# Patient Record
(demographics unavailable — no encounter records)

---

## 2024-10-30 NOTE — HISTORY OF PRESENT ILLNESS
[FreeTextEntry1] : 75-year-old female with hx of HLD  Patient seen by PCP for hot flashes/sweating on and off for 20 years but worsened in 8/2024. Patient had blood work done at that visit showing IgM kappa band, she saw hematology who did not feel any underlying hematological issue was present. She elevated CRP as well. CT of the chest/abdomen/pelvis was done by his PCP  with no suspicious findings.  Patient reports stiffness in neck going down to both shoulder ( R>L) She reports she also have pain in both quads and hamstrings and b/l knee Also left side hip Getting up from seated position hurts, bending down causes pain in her knees. Chronic back in her low back (L5 disc herniation) Morning stiffness until she takes NSAIDs and tylenol.  Reports low grade fever with night sweat - twice during this time. Occasional shortness of breath Chronic diarrhea since C diff 2017    Patient denies  joint swelling, joint erythema/warmth, morning stiffness, fatigue, chills, weight loss, chest pain, abdominal pain, , constipation, blood in stool, dysuria, hematuria, rash, Raynaud's, severe dry eyes, severe dry mouth, , eye pain/redness, vision changes,  jaw clauddication, dysphagia, numbness/tingling Hx of DVT/PEs.   Labs  9/13/2024   Negative COMPA, RF  ESR 25 normal for age  CRP 45     1/26/23  Negative COMPA, SSA, SSB, RF     PMHx: As above PSHx: right hip replacement 2/2024, ankle surgery b//l 2006 Family Hx: Denies family history of rheumatologic conditions including RA, SLE, Sjogren's, Myositis, scleroderma, or vasculitis Social Hx:  Smoking Hx: former, until 1979 EtOH Hx: social  Drug use: denies  Occupation: retired

## 2024-10-30 NOTE — PHYSICAL EXAM
[TextEntry] :   GENERAL: Appears in no acute distress HEENT: EOMI. No conjunctival erythema. Moist mucous membranes. No nasopharyngeal ulcers NECK: Supple, no cervical lymphadenopathy CARDIOVASCULAR: RRR. S1, S2 auscultated.  PULMONARY: Clear to auscultation b/l,  MSK: No active synovitis, swelling, erythema, or warmth. No joint tenderness to palpation. No Bouchards or Heberdens nodes No deformities. Normal ROM of b/l upper and lower extremities. SKIN: No lesions or rashes NEURO: No focal deficits. Motor strength 5/5 in major muscle groups of b/l UE and LE. Sensation to soft touch intact in major dermatomes of b/l UE and LE. PSYCH: Normal affect and thought process.

## 2024-10-30 NOTE — ASSESSMENT
[FreeTextEntry1] : 75-year-old female with hx of HLD  Patient seen by PCP for hot flashes/sweating on and off for 20 years but worsened in 8/2024. Patient had blood work done at that visit showing IgM kappa band, she saw hematology who did not feel any underlying hematological issue was present. She had elevated CRP as well. CT of the chest/abdomen/pelvis was done by his PCP  with no suspicious findings.  Patient reports stiffness in neck going down to both shoulder ( R>L) She reports she also have pain in both quads and hamstrings and b/l knee Also left side hip Getting up from seated position hurts, bending down causes pain in her knees. Chronic back in her low back (L5 disc herniation) Morning stiffness until she takes NSAIDs and tylenol.  Reports low grade fever with night sweat - twice during this time. Occasional shortness of breath Chronic diarrhea since C diff 2017  Had neg COMPA, RF CRP elevated, ESR normal for age  - Causes of high ESR/CRP include systemic inflammatory disease, infection, malignancy. ESR is also higher with increasing age, female sex, anemia, renal disease, and obesity. - Given joint pain, will complete work up with labs as below. Suspicion for possible PMR  (due to shoulder and hip stiffness) however will first complete work up. - Also obtain imaging of neck, shoulders, and knees - Continues NSAIDs/tylenol as needed for now   Follow up 1 month to review above Total time spent in review of patient history, clinical exam, management, counseling, and plan of care:  60min

## 2024-11-26 NOTE — ASSESSMENT
[FreeTextEntry1] : 75-year-old female with hx of HLD ,cervical and lumbar spine degenerative disease   Patient seen by PCP for hot flashes/sweating on and off for 20 years but worsened in 8/2024. Patient had blood work done at that visit showing IgM kappa band, she saw hematology who did not feel any underlying hematological issue was present. She had elevated CRP as well.  CT of the chest/abdomen/pelvis was done by his PCP with no suspicious findings.  Patient symptoms are stiffness in neck going down to both shoulder ( R>L) ; patient is not sure when the shoulder/neck symptoms started She reports she also have pain in both quads and hamstrings and b/l knee  Also left side hip .  The lower extremity symptoms started in June after she was playing pickle ball. Getting up from seated position hurts, bending down causes pain in her knees.  Chronic back in her low back (L5 disc herniation) .  Also reports cervical spine degenerative disease Reports low grade fever with night sweat - twice during this time.  Occasional shortness of breath  Chronic diarrhea since C diff 2017      Had neg COMPA, RF, CCP, 14. 33 eta protein  CRP elevated, ESR elevated     - Suspicion for possible PMR (due to shoulder and hip stiffness) and elevated ESR/CRP .  Denies GCA symptoms - Patient being treated with prednisone for possible PMR.  However, she did not have significant relief with prednisone 15mg which is unusual in PMR. She was given prednisone 20mg for 2 weeks which helped more.She is now down to prednisone 15mg since 11/20 ...complete 2 weeks of 15mg prednisone, then 12.5mg for 2 weeks, then 10mg for 2 weeks, then 7.5mg for 2 weeks, then will taper to  5mg prednisone for 2 weeks, 4mg for 2 weeks, 3mg for 2 weeks, 1mg for 2 weeks then stop. Will see patient in 6 weeks (when she is around 7.5mg)  .  Reviewed side effects of steroid therapy in detail with the patient - Neck symptoms due to deg disease -she reports she has a chronic history of - Shoulder imaging with advanced OA which is likely contributing to her symptoms .  Consult given for orthopedics - Knee mild early OA  -Overall there is component of osteoarthritis which might be contributing to her joint pain.  However we will continue to treat as PMR for now given symptoms.   Follow-up in around 6 weeks   Total time spent in review of patient history, clinical exam, management, counseling, and plan of care:  30min

## 2024-11-26 NOTE — HISTORY OF PRESENT ILLNESS
[FreeTextEntry1] : 75-year-old female with hx of HLD , cervical and lumbar spine degenerative disease  Seen initially in 10/2024   As per initial note:   Patient seen by PCP for hot flashes/sweating on and off for 20 years but worsened in 8/2024. Patient had blood work done at that visit showing IgM kappa band, she saw hematology who did not feel any underlying hematological issue was present. She was noted to have elevated CRP as well.  CT of the chest/abdomen/pelvis was done by his PCP with no suspicious findings.  Patient reports stiffness in neck going down to both shoulder ( R>L)  She reports she also have pain in both quads and hamstrings and b/l knee  Also left side hip  Getting up from seated position hurts, bending down causes pain in her knees.  Chronic back in her low back (L5 disc herniation)  Morning stiffness until she takes NSAIDs and tylenol.  Reports low grade fever with night sweat - twice during this time.  Occasional shortness of breath  Chronic diarrhea since C diff 2017  Patient denies joint swelling, joint erythema/warmth, morning stiffness, fatigue, chills, weight loss, chest pain, abdominal pain, , constipation, blood in stool, dysuria, hematuria, rash, Raynaud's, severe dry eyes, severe dry mouth, , eye pain/redness, vision changes, jaw clauddication, dysphagia, numbness/tingling Hx of DVT/PEs.      Since last visit, given elevated ESR/CRP and symptoms possibly due to PMR, we did trial of  low dose prednisone.   She did not have improvement with prednisone 15mg. Patient dd have improvement in symptoms on prednisone 20mg. Prednisone 20mg from 11/5/24 to 11/19/24 and then taper to prednisone 15mg. Currently on prednisone 15mg since 11/20/24  Since being on prednisone, most symptoms are improved a lot wakes up in AM with shoulder, knees, quads, groin area stiffness that resolves  Noticed some palpitations, has joint close support group for PMR and she was recommended to split the dose of the prednisone to see if there is improvement in the palpitations I discussed results of the imaging with her which shows significant OA of the shoulders as well as cervical spine.      Labs   10/29/24:  ESR  44 CRP 37  Neg CCP, 14.33 eta protein  9/13/2024  Negative COMPA, RF ESR 25 normal for age  CRP 45      1/26/23   Negative COMPA, SSA, SSB, RF      Imaging:   10/30/2024   Xrays   Cervical spine  Straightening of the cervical lordosis may represent muscle spasm. Moderate to severe multilevel spondylosis of the cervical spine   Shoulder  Impression: Mild osteoarthritis of the bilateral shoulders.   Knee:  Impression: Bilateral osteopenia. Bilateral patellar spurring.      ULTRASOUND 11/2024  Shoulder:    IMPRESSION: 1. No active synovitis or tenosynovitis of bilateral shoulders. 2. At least moderate bilateral glenohumeral osteoarthritis with right larger than left effusions partially decompressing into the proximal long head biceps tendon sheaths. 3. Advanced bilateral acromioclavicular arthrosis. 4. Right worse than left rotator cuff and proximal long head biceps tendinosis without significant tear.   PMHx: As above  PSHx: right hip replacement 2/2024, ankle surgery b//l 2006  Family Hx: Denies family history of rheumatologic conditions including RA, SLE, Sjogren's, Myositis, scleroderma, or vasculitis  Social Hx:  Smoking Hx: former, until 1979  EtOH Hx: social  Drug use: denies  Occupation: retired

## 2025-01-02 NOTE — HISTORY OF PRESENT ILLNESS
[FreeTextEntry8] : Patient presents to discuss her rheumatologic issues.  Patient is currently following with rheumatology, diagnosed with PMR and currently on prednisone.  Prednisone has been gradually decreased but she became a little bit more symptomatic as the dose has decreased and rheumatology recommended DMARD but patient is concerned about side effects.  Patient would like my opinion on this matter  Also, patient with adrenal adenoma, did endocrinology E consult and recommendation is to do blood work but they want her off prednisone for 1 month prior to doing labs therefore that is on hold currently

## 2025-01-02 NOTE — ASSESSMENT
[FreeTextEntry1] : Answered questions to the best of my ability but would recommend another rheumatology evaluation to see if they disagree or agree with current rheumatologist recommendation, referral given.  Patient will return to the office as scheduled for regular follow-up  Attending MD available by phone if needed

## 2025-01-13 NOTE — ASSESSMENT
[FreeTextEntry1] : 75-year-old female with hx of HLD ,cervical and lumbar spine degenerative disease, right hip replacement 2/2024   Patient seen by PCP for hot flashes/sweating on and off for 20 years but worsened in 8/2024. Patient had blood work done at that visit showing IgM kappa band, she saw hematology who did not feel any underlying hematological issue was present. She had elevated CRP as well. CT of the chest/abdomen/pelvis was done by his PCP with no suspicious findings. Patient symptoms are stiffness in neck going down to both shoulder ( R>L) ; patient is not sure when the shoulder/neck symptoms started She reports she also have pain in both quads and hamstrings and b/l knee Also left side hip. The lower extremity symptoms started in June after she was playing pickle ball. Getting up from seated position hurts, bending down causes pain in her knees. Chronic back in her low back (L5 disc herniation). Also reports cervical spine degenerative disease Reports low grade fever with night sweat - twice during this time. Occasional shortness of breath Chronic diarrhea since C diff 2017    Had neg COMPA, RF, CCP, 14. 33 eta protein CRP elevated, ESR elevated Work up with increased inflammation markers however no inflammation noted on exam or imaging She does have knee OA, cervical spine OA, and shoulder OA as well . Has had right hip replacement    - Suspicion for possible PMR (due to shoulder and hip stiffness) and elevated ESR/CRP. Denies GCA symptoms - Patient being treated with prednisone for possible PMR. However, she did not have significant relief with prednisone 15mg which is unusual in PMR. She was given prednisone 20mg for 2 weeks which helped more Prednisone 20mg from 11/5/24 to 11/19/24 and then taper to prednisone 15mg.She then did  15mg prednisone for 2 weeks, then 12.5mg for 2 weeks, then 10mg for 2 weeks, then 7.5mg for 2 weeks,  However, her symptoms came back when she went down to 7.5mg (on it for a few days) , then since 1/9/25 she is back on 10mg daily On the 10mg prednisone, she has some stiffness in shoulders in the AM till around 12/1pm.She also reports knee pain.  -chronic neck symptoms due to deg disease  - Shoulder imaging with advanced OA which is likely contributing to her symptoms. Consult given for orthopedics last visit but she has not seen them - Knee mild early OA - offered PT today, deferring -Overall there is component of osteoarthritis which might be contributing to her joint pain. However we will continue to treat as PMR for now given symptoms. Patient not interested in staring DMARD therapy at this time. I discussed Leflunomide and Methotrexate as options.  - Current plan:  Finish 10mg prednisone until end of this week, then taper to : Prednisone 9mg for 2 weeks, then 8mg for 2 weeks, then 7mg for 2 weeks, continue decreasing by 1mg every 2 weeks and follow up while on 5mg or sooner if worsening symptoms -  Pt is on long-term steroid therapy and was advised on the risk of long-term steroids including but not limited to osteonecrosis, peptic ulcers/erosive gastritis, elevated blood pressure, elevated blood sugar, weight gain, osteoporosis, cushingoid features, cataracts, glaucoma, , infections. Pt was advised to monitor blood sugar and blood pressure routinely. She is monitoring bone density with PCP who ordered repeat imaging for her  Follow up 2 months  Total time spent in review of patient history, clinical exam, management, counseling, and plan of care:  40min

## 2025-01-13 NOTE — PHYSICAL EXAM
[TextEntry] : GENERAL: Appears in no acute distress HEENT: EOMI. No conjunctival erythema. Moist mucous membranes. NECK: Supple, no cervical lymphadenopathy CARDIOVASCULAR: RRR. S1, S2 auscultated. PULMONARY: Clear to auscultation b/l, MSK: No active synovitis, swelling, erythema, or warmth. No joint tenderness to palpation. No Bouchards or Heberdens nodes No deformities. Normal ROM of b/l upper and lower extremities. SKIN: No lesions or rashes NEURO: No focal deficits. Motor strength 5/5 in major muscle groups of b/l UE and LE. Sensation to soft touch intact in major dermatomes of b/l UE and LE. PSYCH: Normal affect and thought process.

## 2025-01-13 NOTE — HISTORY OF PRESENT ILLNESS
[FreeTextEntry1] : 75-year-old female with hx of HLD , cervical and lumbar spine degenerative disease, right hip replacement 2/2024  Seen initially in 10/2024  As per initial note:  Patient seen by PCP for hot flashes/sweating on and off for 20 years but worsened in 8/2024. Patient had blood work done at that visit showing IgM kappa band, she saw hematology who did not feel any underlying hematological issue was present. She was noted to have elevated CRP as well. CT of the chest/abdomen/pelvis was done by his PCP with no suspicious findings. Patient reports stiffness in neck going down to both shoulder ( R>L) She reports she also have pain in both quads and hamstrings and b/l knee Also left side hip Getting up from seated position hurts, bending down causes pain in her knees. Chronic back in her low back (L5 disc herniation) Morning stiffness until she takes NSAIDs and tylenol. Reports low grade fever with night sweat - twice during this time. Occasional shortness of breath Chronic diarrhea since C diff 2017 Patient denies joint swelling, joint erythema/warmth, morning stiffness, fatigue, chills, weight loss, chest pain, abdominal pain, , constipation, blood in stool, dysuria, hematuria, rash, Raynaud's, severe dry eyes, severe dry mouth, , eye pain/redness, vision changes, jaw clauddication, dysphagia, numbness/tingling Hx of DVT/PEs.    Since initial visit  on 10/29/24, given elevated ESR/CRP and symptoms possibly due to PMR, we did trial of low dose prednisone. She did not have improvement with prednisone 15mg. Patient did have improvement in symptoms on prednisone 20mg. Prednisone 20mg from 11/5/24 to 11/19/24 and then taper to prednisone 15mg.She then did  15mg prednisone for 2 weeks, then 12.5mg for 2 weeks, then 10mg for 2 weeks, then 7.5mg for 2 weeks,  However, her symptoms came back when she went down to 7.5mg so after around 4 days of this she went back to 10mg (as of 1/9/25 she is back on 10mg daily) On the 10mg prednisone, she has some stiffness in shoulders in the AM till around 12/1pm. She also reports knee pain She is not having significant hip stiffness  She also in a support group for PMR Discussed DMARD therapy however patient  wants to continue prednisone taper for now  Labs  11.26/24  ESR 26 CRP13 10/29/24: ESR 44 CRP 37 Neg CCP, 14.33 eta protein  9/13/2024 Negative COMPA, RF ESR 25 normal for age CRP 45  1/26/23 Negative COMPA, SSA, SSB, RF    Imaging:  10/30/2024  Xrays  Cervical spine Straightening of the cervical lordosis may represent muscle spasm. Moderate to severe multilevel spondylosis of the cervical spine  Shoulder Impression: Mild osteoarthritis of the bilateral shoulders.  Knee: Impression: Bilateral osteopenia. Bilateral patellar spurring.    ULTRASOUND 11/2024 Shoulder:   IMPRESSION: 1. No active synovitis or tenosynovitis of bilateral shoulders. 2. At least moderate bilateral glenohumeral osteoarthritis with right larger than left effusions partially decompressing into the proximal long head biceps tendon sheaths. 3. Advanced bilateral acromioclavicular arthrosis. 4. Right worse than left rotator cuff and proximal long head biceps tendinosis without significant tear.  PMHx: As above PSHx: right hip replacement 2/2024, ankle surgery b//l 2006 Family Hx: Denies family history of rheumatologic conditions including RA, SLE, Sjogren's, Myositis, scleroderma, or vasculitis Social Hx: Smoking Hx: former, until 1979 EtOH Hx: social Drug use: denies Occupation: retired

## 2025-01-30 NOTE — DATA REVIEWED
[FreeTextEntry1] : Blood work done prior to visit: CBC, UA= WNL CMP= WNL Hemoglobin A1c higher at 6.4 LDL decreased to 79 Vitamin D normal  Stress test December 2023 equivocal with cardiac CTA with nonobstructive disease Echo October 2023 = normal LVEF with trace MR carotid duplex October 2021 with right plaque without stenosiS

## 2025-01-30 NOTE — HISTORY OF PRESENT ILLNESS
[FreeTextEntry1] : 75-year-old female presents for her annual wellness visit   Medical history includes hyperlipidemia on pravastatin 40 mg daily and prediabetes.  2024 she had musculoskeletal issues evaluated by rheumatology and diagnosed with polymyalgia rheumatica initially treated with a low-dose of prednisone without benefit then on a higher dose of prednisone with improvement but still some symptoms.  She also has osteoarthritis. Also bone density January 2025 showed spine with osteopenia in hip with osteoporosis therefore told to discuss with rheumatology.  Recent increase in palpitations therefore saw cardiology 2 days ago and has a monitor on.  No chest pain or shortness of breath. She had a cardiac workup December 2020 with negative stress test and echocardiogram normal and only showing trace mitral regurgitation. Carotid duplex October 2021 showing plaque without stenosis on the right. She again had cardiac evaluation with echocardiogram October 2023 which was normal and stress test December 2023 which was equivocal therefore had a cardiac CTA with nonobstructive disease.   Otherwise feeling well. she exercises fairly regularly and follows a fairly good diet.  September, 2017 she needed dental work done and was put on clindamycin with resultant C. difficile colitis. She saw gastroenterology who treated her with p.o. vancomycin with resolution of her symptoms. The patient feels since the episode with C. difficile that her bowels are not the same as she has seen gastroenterology with diagnosis of IBS with diarrhea. Recommendation was to take probiotic daily, which she is doing. There is some improvement, but still not totally normal.  She also had an issue with her lower back and saw orthopedic with MRI August 2019 showing lumbar degenerative disc disease with radicular symptoms.   Also, history of an enchondroma of the right second toe with MRI 2019 showing it to be stable.  Imaging of the abdomen and pelvis showed an adrenal nodule for which the patient is going to see endocrinology.  She had issues with her left parotid gland and had to have a procedure May 2022 sialodoplasty with stenting with benefit.  She now feels she is having an issue on the right side and plans to follow-up with ENT.  She is status post right total hip replacement February 2024 with good benefit.  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 and is  with one daughter

## 2025-01-30 NOTE — ASSESSMENT
[FreeTextEntry1] : 75-year-old female with good general health with recent medical issue being PMR with patient following with rheumatology on prednisone.  Patient with hyperlipidemia with improved LDL to 79 Cardiac workup showing nonobstructive disease with no concern. Hemoglobin A1c in prediabetic range now worsened to 6.4 likely secondary to prednisone for PMR.  Diet and exercise and will be monitored.  Status post issue with left parotid gland status post sialodoplasy May 2022 with success. Now issues with right parotid therefore to follow-up with ENT.  Patient is status post C. difficile colitis September 2017 without residual abnormalities.Post C. difficile change in bowels with symptoms compatible with IBS with diarrhea. Recommend continuing probiotic and add fiber daily.  Adrenal nodule on the left seen on imaging with patient to see endocrinology  Status post right total hip replacement February 2024  Encourage patient improve diet and exercise to improve cholesterol and prediabetes  Influenza and COVID vaccine already received Prevnar 13 received December 2016 Shingrix received x 2 in 2019/2020 Tdap December 2016  Colonoscopy April 2022 with follow-up in 7 years Mammography December 2024 Bone density January 2025 with spine osteopenia and hip osteoporosis therefore discussed with rheumatology GYN yearly  Followup in 6 months. .

## 2025-01-30 NOTE — PHYSICAL EXAM
[FreeTextEntry1] : via GYN [Over the Past 2 Weeks, Have You Felt Down, Depressed, or Hopeless?] : 1.) Over the past 2 weeks, have you felt down, depressed, or hopeless? No [Over the Past 2 Weeks, Have You Felt Little Interest or Pleasure Doing Things?] : 2.) Over the past 2 weeks, have you felt little interest or pleasure doing things? No

## 2025-01-30 NOTE — HEALTH RISK ASSESSMENT
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

## 2025-02-27 NOTE — DISCUSSION/SUMMARY
[de-identified] :  SINDY AHUJA is a 75 year old female who presents with bilateral knee mild medial compartment arthritis.  Nonoperative treatment options for knee arthritis were discussed including anti-inflammatories, physical therapy, intraarticular cortisone injections, and hyaluronic acid gel injections.  A course of Mobic was recommended. The patient was given a prescription for the Mobic with directions. She was instructed to stop the medicine and call the office if there are any adverse reaction to the medicine.  A prescription for physical therapy was provided.  The patient declined injections today.  She will follow-up for injections in the future if her pain progresses.

## 2025-02-27 NOTE — PHYSICAL EXAM
[de-identified] :  The patient appears well nourished and in no apparent distress. The patient is alert and oriented to person, place, and time. Affect and mood appear normal. The head is normocephalic and atraumatic. The eyes reveal normal sclera and extra ocular muscles are intact. The tongue is midline with no apparent lesions. Skin shows normal turgor with no evidence of eczema or psoriasis. No respiratory distress noted. Sensation grossly intact. [de-identified] :  Exam of the bilateral knee shows 0 to 120 degrees of flexion measured with a goniometer. There is pain with flexion. 5/5 motor strength bilaterally distally. Sensation intact distally [de-identified] : Outside X-ray: 4 views of the left knee demonstrate mild medial compartment arthritis Outside X-ray: 4 views of the right knee demonstrate mild medial compartment arthritis

## 2025-02-27 NOTE — HISTORY OF PRESENT ILLNESS
[de-identified] : Ms. SINDY AHUJA is a 75 year old female presenting for evaluation of bilateral knee pain.  She was recently diagnosed with polymyalgia rheumatica and has had multiple joint pains.  She is now on prednisone.  She is reporting intermittent pain in both knees particularly posteriorly and deep flexion.  The pain is worse in the mornings and improves in the afternoon and at night she is relatively pain-free.

## 2025-03-20 NOTE — PHYSICAL EXAM
[TextEntry] :     GENERAL: Appears in no acute distress HEENT: EOMI. No conjunctival erythema. Moist mucous membranes. NECK: Supple, no cervical lymphadenopathy CARDIOVASCULAR: RRR. S1, S2 auscultated. PULMONARY: Clear to auscultation b/l, MSK: No active synovitis, swelling, erythema, or warmth. No joint tenderness to palpation. No Bouchards or Heberdens nodes No deformities. Normal ROM of b/l upper and lower extremities. SKIN: No lesions or rashes NEURO: No focal deficits. Motor strength 5/5 in major muscle groups of b/l UE and LE. PSYCH: Normal affect and thought process.

## 2025-03-20 NOTE — HISTORY OF PRESENT ILLNESS
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 Patient had blood work done at that visit showing IgM kappa band, she saw hematology who did not feel any underlying hematological issue was present. She was noted to have elevated CRP as well.  CT of the chest/abdomen/pelvis was done by his PCP with no suspicious findings.  Patient reports stiffness in neck going down to both shoulder ( R>L)  She reports she also have pain in both quads and hamstrings and b/l knee  Also left side hip  Getting up from seated position hurts, bending down causes pain in her knees.  Chronic back in her low back (L5 disc herniation)  Morning stiffness until she takes NSAIDs and tylenol.  Reports low grade fever with night sweat - twice during this time.  Occasional shortness of breath  Chronic diarrhea since C diff 2017   Patient denies joint swelling, joint erythema/warmth, morning stiffness, fatigue, chills, weight loss, chest pain, abdominal pain, , constipation, blood in stool, dysuria, hematuria, rash, Raynaud's, severe dry eyes, severe dry mouth, , eye pain/redness, vision changes, jaw clauddication, dysphagia, numbness/tingling Hx of DVT/PEs.   Since initial visit on 10/29/24, given elevated ESR/CRP and symptoms possibly due to PMR, we did trial of low dose prednisone.  She did not have improvement with prednisone 15mg. Patient did have improvement in symptoms on prednisone 20mg. Prednisone 20mg from 11/5/24 to 11/19/24 and then taper to prednisone 15mg.She then did 15mg prednisone for 2 weeks, then 12.5mg for 2 weeks, then 10mg for 2 weeks, then 7.5mg for 2 weeks,  However, her symptoms came back when she went down to 7.5mg so after around 4 days of this she went back to 10mg (as of 1/9/25 she is back on 10mg daily)  On the 10mg prednisone, she has some stiffness in shoulders in the AM till around 12/1pm.  Last visit, plan was to taper below 10mg however started having stiffness in her shoulders/hips when down to 8 mg daily. Therefore, in 2/2025, the prednisone was once again increased back to 10mg and advised  taper by 1 mg every 2 weeks . She is now on 6mg prednisone...continues to have morning stiffness, improves by afternoon  We discussed leflunomide versus methotrexate for treatment of PMR. Discussed side effects and benefits of steroid sparing DMARD therapy.   She also in a support group for PMR She is also following with primary for DEXA monitoring  Today patient reports she does not want to start MTX or Lef. She is interested in starting HCQ.     Labs  1/2025 ESR 33, CRP 6  , normal CMP, CBC   11.26/24  ESR 26 CRP13  10/29/24  ESR 44 CRP 37  Neg CCP, 14.33 eta protein      9/13/2024  Negative COMPA, RF ESR 25 normal for age  CRP 45   1/26/23  Negative COMPA, SSA, SSB, RF     Imaging:  10/30/2024   Xrays   Cervical spine  Straightening of the cervical lordosis may represent muscle spasm. Moderate to severe multilevel spondylosis of the cervical spine   Shoulder  Impression: Mild osteoarthritis of the bilateral shoulders.   Knee:  Impression: Bilateral osteopenia. Bilateral patellar spurring.    ULTRASOUND 11/2024  Shoulder:     IMPRESSION: 1. No active synovitis or tenosynovitis of bilateral shoulders. 2. At least moderate bilateral glenohumeral osteoarthritis with right larger than left effusions partially decompressing into the proximal long head biceps tendon sheaths. 3. Advanced bilateral acromioclavicular arthrosis. 4. Right worse than left rotator cuff and proximal long head biceps tendinosis without significant tear.     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

## 2025-03-20 NOTE — ASSESSMENT
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 Shoulder involvement and elevated ESR/CRP  Chronic back in her low back (L5 disc herniation). Also reports cervical spine degenerative disease  Reports low grade fever with night sweat - twice during this time.  Occasional shortness of breath  Chronic diarrhea since C diff 2017   Had neg COMPA, RF, CCP, 14. 33 eta protein  CRP elevated, ESR elevated  Work up with increased inflammation markers however no inflammation noted on exam or imaging  She does have knee OA, cervical spine OA, and shoulder OA as well. Has had right hip replacement   - Following for PMR (due to shoulder and hip stiffness) and elevated ESR/CRP. Denies GCA symptoms  - Patient has had recurrent symptoms multiple times with prednisone tapering past 10mg  - Also chronic neck symptoms due to deg disease  - Shoulder imaging with advanced OA which is likely contributing to her symptoms as well. Consult given for orthopedics at a previous visit but she has not seen them  for this - Knee mild early OA -patient doing PT now, has also seen ortho -Overall there is component of osteoarthritis which might be contributing to her joint pain. However we will continue to treat as PMR for now given classic symptoms of shoulder hip stiffness along with elevated ESR/CRP. Patient not interested in staring therapy with  Leflunomide or Methotrexate  - Current plan:  Finish prednisone taper 6mg to 1mg - go down by 1 mg every 2 weeks. Patient reports she does not need refills at this time. - Pt is on long-term steroid therapy and was advised on the risk of long-term steroids including but not limited to osteonecrosis, peptic ulcers/erosive gastritis, elevated blood pressure, elevated blood sugar, weight gain, osteoporosis, cushingoid features, cataracts, glaucoma, , infections. Pt was advised to monitor blood sugar and blood pressure routinely. Tapering off prednisone to avoid severe long term side effects -Patient reports she does not want to start MTX or Lef. She is interested in starting HCQ as some people in her support group are on it. I explained its not an indicated tx for PMR but if patient wants to do a trial we can start for inflammatory arthritis HCQ dosage is <5mg/kg/day or <400mg/day to minimize risk of retinal toxicity. Patient will take 300mg Side effects of HCQ were discussed with the patient including but not limited to GI upset, retinal toxicity, QT prolongation, cytopenias, myopathy. Discussed the importance of yearly ophthalmology evaluation. Patient will contact her ophtho as well as cardiologist. Educated on symptoms to monior Follow up 10 weeks    Total time spent in review of patient history, clinical exam, management, counseling, and plan of care:32  min

## 2025-04-18 NOTE — HISTORY OF PRESENT ILLNESS
[FreeTextEntry1] : 4/15/25 - transfer of care -Patient feels frustrated 6 months w/ PMR diagnosis and does not feel she has improved in terms of pain and function with steroid. 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 Has not felt significant difference in pain or function with higher doses of prednisone or with taper.  - Felt more relief when using ibuprofen/tylenol. Known diffuse OA -Morning difficulty getting out of bed due to neck, knees, shoulder and hip pain 5-6/10, AMS can be up to 4 hrs . Currently in PT for b/l knees stretching doing exercise would like PT script for b/l shoulders. -Started HCQ a few weeks ago 300mg daily. Aware that this medication will take several months to take effect -Ophthalmologist macular issues which may be exacerbated by prednisone following w/ Dr. Stovall at [facility]. 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 Patient had blood work done at that visit showing IgM kappa band, she saw hematology who did not feel any underlying hematological issue was present. She was noted to have elevated CRP as well.  CT of the chest/abdomen/pelvis was done by his PCP with no suspicious findings.  Patient reports stiffness in neck going down to both shoulder ( R>L)  She reports she also have pain in both quads and hamstrings and b/l knee  Also left side hip  Getting up from seated position hurts, bending down causes pain in her knees.  Chronic back in her low back (L5 disc herniation)  Morning stiffness until she takes NSAIDs and tylenol.  Reports low grade fever with night sweat - twice during this time.  Occasional shortness of breath  Chronic diarrhea since C diff 2017   Patient denies joint swelling, joint erythema/warmth, morning stiffness, fatigue, chills, weight loss, chest pain, abdominal pain, , constipation, blood in stool, dysuria, hematuria, rash, Raynaud's, severe dry eyes, severe dry mouth, , eye pain/redness, vision changes, jaw clauddication, dysphagia, numbness/tingling Hx of DVT/PEs.   Since initial visit on 10/29/24, given elevated ESR/CRP and symptoms possibly due to PMR, we did trial of low dose prednisone.  She did not have improvement with prednisone 15mg. Patient did have improvement in symptoms on prednisone 20mg. Prednisone 20mg from 11/5/24 to 11/19/24 and then taper to prednisone 15mg.She then did 15mg prednisone for 2 weeks, then 12.5mg for 2 weeks, then 10mg for 2 weeks, then 7.5mg for 2 weeks,  However, her symptoms came back when she went down to 7.5mg so after around 4 days of this she went back to 10mg (as of 1/9/25 she is back on 10mg daily)  On the 10mg prednisone, she has some stiffness in shoulders in the AM till around 12/1pm.  Last visit, plan was to taper below 10mg however started having stiffness in her shoulders/hips when down to 8 mg daily. Therefore, in 2/2025, the prednisone was once again increased back to 10mg and advised  taper by 1 mg every 2 weeks . She is now on 6mg prednisone...continues to have morning stiffness, improves by afternoon  We discussed leflunomide versus methotrexate for treatment of PMR. Discussed side effects and benefits of steroid sparing DMARD therapy.   She also in a support group for PMR She is also following with primary for DEXA monitoring  Today patient reports she does not want to start MTX or Lef. She is interested in starting HCQ.     Labs  1/2025 ESR 33, CRP 6  , normal CMP, CBC   11.26/24  ESR 26 CRP13  10/29/24  ESR 44 CRP 37  Neg CCP, 14.33 eta protein      9/13/2024  Negative COMPA, RF ESR 25 normal for age  CRP 45   1/26/23  Negative COMPA, SSA, SSB, RF     Imaging:  10/30/2024   Xrays   Cervical spine  Straightening of the cervical lordosis may represent muscle spasm. Moderate to severe multilevel spondylosis of the cervical spine   Shoulder  Impression: Mild osteoarthritis of the bilateral shoulders.   Knee:  Impression: Bilateral osteopenia. Bilateral patellar spurring.    ULTRASOUND 11/2024  Shoulder:     IMPRESSION: 1. No active synovitis or tenosynovitis of bilateral shoulders. 2. At least moderate bilateral glenohumeral osteoarthritis with right larger than left effusions partially decompressing into the proximal long head biceps tendon sheaths. 3. Advanced bilateral acromioclavicular arthrosis. 4. Right worse than left rotator cuff and proximal long head biceps tendinosis without significant tear.     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      
[FreeTextEntry1] : 4/15/25 - transfer of care -Patient feels frustrated 6 months w/ PMR diagnosis and does not feel she has improved in terms of pain and function with steroid. 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 Has not felt significant difference in pain or function with higher doses of prednisone or with taper.  - Felt more relief when using ibuprofen/tylenol. Known diffuse OA -Morning difficulty getting out of bed due to neck, knees, shoulder and hip pain 5-6/10, AMS can be up to 4 hrs . Currently in PT for b/l knees stretching doing exercise would like PT script for b/l shoulders. -Started HCQ a few weeks ago 300mg daily. Aware that this medication will take several months to take effect -Ophthalmologist macular issues which may be exacerbated by prednisone following w/ Dr. Stovall at [facility]. 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 Patient had blood work done at that visit showing IgM kappa band, she saw hematology who did not feel any underlying hematological issue was present. She was noted to have elevated CRP as well.  CT of the chest/abdomen/pelvis was done by his PCP with no suspicious findings.  Patient reports stiffness in neck going down to both shoulder ( R>L)  She reports she also have pain in both quads and hamstrings and b/l knee  Also left side hip  Getting up from seated position hurts, bending down causes pain in her knees.  Chronic back in her low back (L5 disc herniation)  Morning stiffness until she takes NSAIDs and tylenol.  Reports low grade fever with night sweat - twice during this time.  Occasional shortness of breath  Chronic diarrhea since C diff 2017   Patient denies joint swelling, joint erythema/warmth, morning stiffness, fatigue, chills, weight loss, chest pain, abdominal pain, , constipation, blood in stool, dysuria, hematuria, rash, Raynaud's, severe dry eyes, severe dry mouth, , eye pain/redness, vision changes, jaw clauddication, dysphagia, numbness/tingling Hx of DVT/PEs.   Since initial visit on 10/29/24, given elevated ESR/CRP and symptoms possibly due to PMR, we did trial of low dose prednisone.  She did not have improvement with prednisone 15mg. Patient did have improvement in symptoms on prednisone 20mg. Prednisone 20mg from 11/5/24 to 11/19/24 and then taper to prednisone 15mg.She then did 15mg prednisone for 2 weeks, then 12.5mg for 2 weeks, then 10mg for 2 weeks, then 7.5mg for 2 weeks,  However, her symptoms came back when she went down to 7.5mg so after around 4 days of this she went back to 10mg (as of 1/9/25 she is back on 10mg daily)  On the 10mg prednisone, she has some stiffness in shoulders in the AM till around 12/1pm.  Last visit, plan was to taper below 10mg however started having stiffness in her shoulders/hips when down to 8 mg daily. Therefore, in 2/2025, the prednisone was once again increased back to 10mg and advised  taper by 1 mg every 2 weeks . She is now on 6mg prednisone...continues to have morning stiffness, improves by afternoon  We discussed leflunomide versus methotrexate for treatment of PMR. Discussed side effects and benefits of steroid sparing DMARD therapy.   She also in a support group for PMR She is also following with primary for DEXA monitoring  Today patient reports she does not want to start MTX or Lef. She is interested in starting HCQ.     Labs  1/2025 ESR 33, CRP 6  , normal CMP, CBC   11.26/24  ESR 26 CRP13  10/29/24  ESR 44 CRP 37  Neg CCP, 14.33 eta protein      9/13/2024  Negative COMPA, RF ESR 25 normal for age  CRP 45   1/26/23  Negative COMPA, SSA, SSB, RF     Imaging:  10/30/2024   Xrays   Cervical spine  Straightening of the cervical lordosis may represent muscle spasm. Moderate to severe multilevel spondylosis of the cervical spine   Shoulder  Impression: Mild osteoarthritis of the bilateral shoulders.   Knee:  Impression: Bilateral osteopenia. Bilateral patellar spurring.    ULTRASOUND 11/2024  Shoulder:     IMPRESSION: 1. No active synovitis or tenosynovitis of bilateral shoulders. 2. At least moderate bilateral glenohumeral osteoarthritis with right larger than left effusions partially decompressing into the proximal long head biceps tendon sheaths. 3. Advanced bilateral acromioclavicular arthrosis. 4. Right worse than left rotator cuff and proximal long head biceps tendinosis without significant tear.     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      
[FreeTextEntry1] : 4/15/25 - transfer of care -Patient feels frustrated 6 months w/ PMR diagnosis and does not feel she has improved in terms of pain and function with steroid. 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 Has not felt significant difference in pain or function with higher doses of prednisone or with taper.  - Felt more relief when using ibuprofen/tylenol. Known diffuse OA -Morning difficulty getting out of bed due to neck, knees, shoulder and hip pain 5-6/10, AMS can be up to 4 hrs . Currently in PT for b/l knees stretching doing exercise would like PT script for b/l shoulders. -Started HCQ a few weeks ago 300mg daily. Aware that this medication will take several months to take effect -Ophthalmologist macular issues which may be exacerbated by prednisone following w/ Dr. Stovall at [facility]. 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 Patient had blood work done at that visit showing IgM kappa band, she saw hematology who did not feel any underlying hematological issue was present. She was noted to have elevated CRP as well.  CT of the chest/abdomen/pelvis was done by his PCP with no suspicious findings.  Patient reports stiffness in neck going down to both shoulder ( R>L)  She reports she also have pain in both quads and hamstrings and b/l knee  Also left side hip  Getting up from seated position hurts, bending down causes pain in her knees.  Chronic back in her low back (L5 disc herniation)  Morning stiffness until she takes NSAIDs and tylenol.  Reports low grade fever with night sweat - twice during this time.  Occasional shortness of breath  Chronic diarrhea since C diff 2017   Patient denies joint swelling, joint erythema/warmth, morning stiffness, fatigue, chills, weight loss, chest pain, abdominal pain, , constipation, blood in stool, dysuria, hematuria, rash, Raynaud's, severe dry eyes, severe dry mouth, , eye pain/redness, vision changes, jaw clauddication, dysphagia, numbness/tingling Hx of DVT/PEs.   Since initial visit on 10/29/24, given elevated ESR/CRP and symptoms possibly due to PMR, we did trial of low dose prednisone.  She did not have improvement with prednisone 15mg. Patient did have improvement in symptoms on prednisone 20mg. Prednisone 20mg from 11/5/24 to 11/19/24 and then taper to prednisone 15mg.She then did 15mg prednisone for 2 weeks, then 12.5mg for 2 weeks, then 10mg for 2 weeks, then 7.5mg for 2 weeks,  However, her symptoms came back when she went down to 7.5mg so after around 4 days of this she went back to 10mg (as of 1/9/25 she is back on 10mg daily)  On the 10mg prednisone, she has some stiffness in shoulders in the AM till around 12/1pm.  Last visit, plan was to taper below 10mg however started having stiffness in her shoulders/hips when down to 8 mg daily. Therefore, in 2/2025, the prednisone was once again increased back to 10mg and advised  taper by 1 mg every 2 weeks . She is now on 6mg prednisone...continues to have morning stiffness, improves by afternoon  We discussed leflunomide versus methotrexate for treatment of PMR. Discussed side effects and benefits of steroid sparing DMARD therapy.   She also in a support group for PMR She is also following with primary for DEXA monitoring  Today patient reports she does not want to start MTX or Lef. She is interested in starting HCQ.     Labs  1/2025 ESR 33, CRP 6  , normal CMP, CBC   11.26/24  ESR 26 CRP13  10/29/24  ESR 44 CRP 37  Neg CCP, 14.33 eta protein      9/13/2024  Negative COMPA, RF ESR 25 normal for age  CRP 45   1/26/23  Negative COMPA, SSA, SSB, RF     Imaging:  10/30/2024   Xrays   Cervical spine  Straightening of the cervical lordosis may represent muscle spasm. Moderate to severe multilevel spondylosis of the cervical spine   Shoulder  Impression: Mild osteoarthritis of the bilateral shoulders.   Knee:  Impression: Bilateral osteopenia. Bilateral patellar spurring.    ULTRASOUND 11/2024  Shoulder:     IMPRESSION: 1. No active synovitis or tenosynovitis of bilateral shoulders. 2. At least moderate bilateral glenohumeral osteoarthritis with right larger than left effusions partially decompressing into the proximal long head biceps tendon sheaths. 3. Advanced bilateral acromioclavicular arthrosis. 4. Right worse than left rotator cuff and proximal long head biceps tendinosis without significant tear.     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      
abd pain, chest pain, hematemesis

## 2025-04-18 NOTE — PHYSICAL EXAM
[TextEntry] :     GENERAL: Appears in no acute distress HEENT: EOMI. No conjunctival erythema. Moist mucous membranes. NECK: Supple, no cervical lymphadenopathy CARDIOVASCULAR: RRR. S1, S2 auscultated. PULMONARY: Clear to auscultation b/l, MSK: No active synovitis, swelling, erythema, or warmth. No joint tenderness to palpation. No Bouchards or Heberdens nodes No deformities. Normal ROM of b/l upper and lower extremities. SKIN: No lesions or rashes NEURO: No focal deficits. Motor strength 5/5 in major muscle groups of b/l UE and LE. PSYCH: Normal affect and thought process.  [General Appearance - Alert] : alert [General Appearance - In No Acute Distress] : in no acute distress [General Appearance - Well Nourished] : well nourished [Sclera] : the sclera and conjunctiva were normal [Extraocular Movements] : extraocular movements were intact [Outer Ear] : the ears and nose were normal in appearance [Neck Appearance] : the appearance of the neck was normal [Neck Cervical Mass (___cm)] : no neck mass was observed [Respiration, Rhythm And Depth] : normal respiratory rhythm and effort [Exaggerated Use Of Accessory Muscles For Inspiration] : no accessory muscle use [Auscultation Breath Sounds / Voice Sounds] : lungs were clear to auscultation bilaterally [Heart Rate And Rhythm] : heart rate was normal and rhythm regular [Heart Sounds] : normal S1 and S2 [Heart Sounds Gallop] : no gallops [Murmurs] : no murmurs [Heart Sounds Pericardial Friction Rub] : no pericardial rub [Cervical Lymph Nodes Enlarged Posterior Bilaterally] : posterior cervical [Cervical Lymph Nodes Enlarged Anterior Bilaterally] : anterior cervical [Supraclavicular Lymph Nodes Enlarged Bilaterally] : supraclavicular [No CVA Tenderness] : no ~M costovertebral angle tenderness [No Spinal Tenderness] : no spinal tenderness [] : no rash [No Focal Deficits] : no focal deficits [Oriented To Time, Place, And Person] : oriented to person, place, and time [Impaired Insight] : insight and judgment were intact [Mood] : the mood was normal [FreeTextEntry1] : Frustrated and tearful during exam

## 2025-04-18 NOTE — REVIEW OF SYSTEMS
[As Noted in HPI] : as noted in HPI [Negative] : Respiratory [FreeTextEntry2] : Frustrated, tearful during exam

## 2025-04-18 NOTE — ASSESSMENT
[FreeTextEntry1] : 76-year-old female with hx of HLD, cervical and lumbar spine degenerative disease, right hip replacement 2/2024    #Arthralgias (neg COMPA, 14.3.3, RF, CCP Currently being treated PMR dx, AMS 2-4 hours, for last 6 months recently started HCQ 300mg daily a few weeks ago. Slowly tapering off prednisone currently 5 mg would step down to 4 mg tomorrow. Has not felt improvement in terms of pain and function even on higher dose steroids (max 20mg SE palpitations). Noted more relieve with using ibuprofen/tylenol. Known OA diffusely (neck, bilateral shoulders, bilateral knees, L hip and R THR). Otherwise neg ROS. 01/25 CRP 6, WBC 12.99, ESR 33 neg/nl CMP - On exam today, rROM L knee approx 10-15 degrees and POM L hip flexor and knee - XR b/l shoulder advanced AC arthrosis b/l, b/l moderate GH OA w/ effusions R>L - Discussed involvement knee, and minimal response steroid uncharacteristic of PMR - Updated labs ordered today - Continue taper prednisone since not effective for pain or stiffness - Ordered US-guided aspiration and fluid analysis ddx CPPD, SpA, seronegative RA - Continue HCQ 300mg (4.65mg/kg) daily - followed by ophthalmology aware of rx - PT script b/l shoulders added to script b/l knees  #Adrenal adenoma No symptoms suggestive of adrenal dysfunction. Scheduled for evaluation endocrinology 05/25   PLAN 4/15/25: -R knee vs shoulders US-guided aspiration send for fluid analysis  -Ordered PT script b/l shoulders  -Labs drawn today  -Continue HCQ  -Continue prednisone taper  Patient was seen and evaluated by Jennifer Germain DNP (training in rheumatology) and with EUGENIA Welsh available for consult regarding patient plan of care.

## 2025-05-15 NOTE — PHYSICAL EXAM
[General Appearance - Alert] : alert [General Appearance - In No Acute Distress] : in no acute distress [General Appearance - Well Nourished] : well nourished [Sclera] : the sclera and conjunctiva were normal [Extraocular Movements] : extraocular movements were intact [Outer Ear] : the ears and nose were normal in appearance [Neck Appearance] : the appearance of the neck was normal [Neck Cervical Mass (___cm)] : no neck mass was observed [Respiration, Rhythm And Depth] : normal respiratory rhythm and effort [Exaggerated Use Of Accessory Muscles For Inspiration] : no accessory muscle use [Auscultation Breath Sounds / Voice Sounds] : lungs were clear to auscultation bilaterally [Heart Rate And Rhythm] : heart rate was normal and rhythm regular [Heart Sounds] : normal S1 and S2 [Heart Sounds Gallop] : no gallops [Murmurs] : no murmurs [Heart Sounds Pericardial Friction Rub] : no pericardial rub [Cervical Lymph Nodes Enlarged Posterior Bilaterally] : posterior cervical [Cervical Lymph Nodes Enlarged Anterior Bilaterally] : anterior cervical [Supraclavicular Lymph Nodes Enlarged Bilaterally] : supraclavicular [No CVA Tenderness] : no ~M costovertebral angle tenderness [No Spinal Tenderness] : no spinal tenderness [] : no rash [No Focal Deficits] : no focal deficits [Oriented To Time, Place, And Person] : oriented to person, place, and time [Impaired Insight] : insight and judgment were intact [Mood] : the mood was normal [General Appearance - Well Developed] : well developed [FreeTextEntry1] : Not tearful during exam today but still clearly anxious

## 2025-05-15 NOTE — HISTORY OF PRESENT ILLNESS
[FreeTextEntry1] : 5/13/25  -Takes until the afternoon and pain subsides, stiffness not bad. Weather dependent -Started PT for bilateral shoulders 10 visits so far but doesn't feel that that's making a difference. -Did not try meloxicam -Since last visit patient tapered prednisone to 3mg which was not effective even at higher doses for pain and discontinued HCQ reported diarrhea though was well tolerated for 3 weeks prior. -Last colonoscopy 2-3 years ago. 8 years ago C.diff following clindamycin. Gastroenterology Dr. Tino Hurtado -Started on physical therapy shoulders  -No available effusions for aspiration and fluid analysis for inflammatory vs non-inflammatory findings -Adrenal adenoma 12mm on endo evaluation compared to 4mm on radiology evaluation plan for monitoring and updated imaging 6 months and eval adrenal hormones now and then after prednisone  US L knee 4/25/25 no effusion | US b/l shoulders 04/25/25 no effusion US 11/24 bilateral shoulders moderate b/l GH OA w/ R> L effusions and partially decompression into proximal long head biceps tendon sheaths, advanced AC OA, b/l rotator cuff tendinosis XR bilateral knees 11/24: b/l patellar spurring vs enthesopathy 04/25 labs: CRP 12, CBC mild L shift, VECTRA 57 elevated VEGF 890 neg/nl CMP, sUA, CMP, lyme, ESR 25 Recently neg 14.3.3, RF, CCP, mary MT spotted fever ------------- 4/15/25 - transfer of care -Patient feels frustrated 6 months w/ PMR diagnosis and does not feel she has improved in terms of pain and function with steroid. 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 Has not felt significant difference in pain or function with higher doses of prednisone or with taper.  - Felt more relief when using ibuprofen/tylenol. Known diffuse OA -Morning difficulty getting out of bed due to neck, knees, shoulder and hip pain 5-6/10, AMS can be up to 4 hrs . Currently in PT for b/l knees stretching doing exercise would like PT script for b/l shoulders. -Started HCQ a few weeks ago 300mg daily. Aware that this medication will take several months to take effect -Ophthalmologist macular issues which may be exacerbated by prednisone following w/ Dr. Stovall at [facility]. 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 Patient had blood work done at that visit showing IgM kappa band, she saw hematology who did not feel any underlying hematological issue was present. She was noted to have elevated CRP as well.  CT of the chest/abdomen/pelvis was done by his PCP with no suspicious findings.  Patient reports stiffness in neck going down to both shoulder ( R>L)  She reports she also have pain in both quads and hamstrings and b/l knee  Also left side hip  Getting up from seated position hurts, bending down causes pain in her knees.  Chronic back in her low back (L5 disc herniation)  Morning stiffness until she takes NSAIDs and tylenol.  Reports low grade fever with night sweat - twice during this time.  Occasional shortness of breath  Chronic diarrhea since C diff 2017   Patient denies joint swelling, joint erythema/warmth, morning stiffness, fatigue, chills, weight loss, chest pain, abdominal pain, , constipation, blood in stool, dysuria, hematuria, rash, Raynaud's, severe dry eyes, severe dry mouth, , eye pain/redness, vision changes, jaw clauddication, dysphagia, numbness/tingling Hx of DVT/PEs.   Since initial visit on 10/29/24, given elevated ESR/CRP and symptoms possibly due to PMR, we did trial of low dose prednisone.  She did not have improvement with prednisone 15mg. Patient did have improvement in symptoms on prednisone 20mg. Prednisone 20mg from 11/5/24 to 11/19/24 and then taper to prednisone 15mg.She then did 15mg prednisone for 2 weeks, then 12.5mg for 2 weeks, then 10mg for 2 weeks, then 7.5mg for 2 weeks,  However, her symptoms came back when she went down to 7.5mg so after around 4 days of this she went back to 10mg (as of 1/9/25 she is back on 10mg daily)  On the 10mg prednisone, she has some stiffness in shoulders in the AM till around 12/1pm.  Last visit, plan was to taper below 10mg however started having stiffness in her shoulders/hips when down to 8 mg daily. Therefore, in 2/2025, the prednisone was once again increased back to 10mg and advised  taper by 1 mg every 2 weeks . She is now on 6mg prednisone...continues to have morning stiffness, improves by afternoon  We discussed leflunomide versus methotrexate for treatment of PMR. Discussed side effects and benefits of steroid sparing DMARD therapy.   She also in a support group for PMR She is also following with primary for DEXA monitoring  Today patient reports she does not want to start MTX or Lef. She is interested in starting HCQ.     Labs  1/2025 ESR 33, CRP 6  , normal CMP, CBC   11.26/24  ESR 26 CRP13  10/29/24  ESR 44 CRP 37  Neg CCP, 14.33 eta protein      9/13/2024  Negative COMPA, RF ESR 25 normal for age  CRP 45   1/26/23  Negative COMPA, SSA, SSB, RF     Imaging:  10/30/2024   Xrays   Cervical spine  Straightening of the cervical lordosis may represent muscle spasm. Moderate to severe multilevel spondylosis of the cervical spine   Shoulder  Impression: Mild osteoarthritis of the bilateral shoulders.   Knee:  Impression: Bilateral osteopenia. Bilateral patellar spurring.    ULTRASOUND 11/2024  Shoulder:     IMPRESSION: 1. No active synovitis or tenosynovitis of bilateral shoulders. 2. At least moderate bilateral glenohumeral osteoarthritis with right larger than left effusions partially decompressing into the proximal long head biceps tendon sheaths. 3. Advanced bilateral acromioclavicular arthrosis. 4. Right worse than left rotator cuff and proximal long head biceps tendinosis without significant tear.     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

## 2025-05-15 NOTE — ASSESSMENT
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 Slowly tapering off prednisone currently 5 mg would step down to 4 mg tomorrow. Has not felt improvement in terms of pain and function even on higher dose steroids (max 20mg SE palpitations). Noted more relieve with using ibuprofen/tylenol. Known OA diffusely (neck, bilateral shoulders, bilateral knees, L hip and R THR). Otherwise neg ROS. 01/25 CRP 6, WBC 12.99, ESR 33 neg/nl CMP No change in overall exam rROM L knee approx 10-15 degrees and POM L hip flexor and knee US L knee 4/25/25 no effusion | US b/l shoulders 04/25/25 no effusion to aspirate US 11/24 bilateral shoulders moderate b/l GH OA w/ R> L effusions and partially decompression into proximal long head biceps tendon sheaths, advanced AC OA, b/l rotator cuff tendinosis XR bilateral knees 11/24: b/l patellar spurring vs enthesopathy 04/25 labs: CRP 12, CBC mild L shift, VECTRA 57 elevated VEGF 890 neg/nl CMP, sUA, CMP, lyme, ESR 25 Recently neg 14.3.3, RF, CCP, mary MT spotted fever No change in sx on prednisone 3mg, or with PT At this time, pt does not have convincing sxs suggestive of persistent aiCTD however elevated VECTRA, CRP suggestive of some systemic inflammation. Effusion resolved so unable to aspirate for fluid analysis. Also maintain suspicious for potential resolving reactive arthritis. Discussed with patient at length without clear evidence supporting aiCTD diagnosis HCQ is most appropriate and safest medication in terms of SE profile to continue. Pt will consider retrial at lower dose, subtherapeutic 200mg daily to assess tolerability Pt will continue prednisone taper Additionally imaging recommended MR L shoulder and L knee. If not inflammatory findings advised proceed with OA diagnosis and trial of US-guided IACS NOTE: -Pt attributing episode of worsening diarrhea and hair shedding to HCQ so discontinued  #Adrenal adenoma No symptoms suggestive of adrenal dysfunction. Adrenal adenoma 12mm on endo evaluation compared to 4mm on radiology evaluation plan for monitoring and updated imaging 6 months and eval adrenal hormones now and then after prednisone  PLAN 5/13/25 -MR L knee and L shoulder  -Continue PT script b/l shoulders  -Consider lower dose retrial HCQ 200mg daily discussed not therapeutic but to trial tolerability  -Continue prednisone taper currently 3mg should be completed tapered office by next appointment  -F/u 1 month RPA  Patient was seen and evaluated by Jennifer Germain DNP (training in rheumatology) and with EUGENIA Stamatos available for consult regarding patient plan of care.

## 2025-05-15 NOTE — REVIEW OF SYSTEMS
[Negative] : Respiratory [Arthralgias] : arthralgias [As Noted in HPI] : as noted in HPI [FreeTextEntry2] : Still anxious about potential diagnosis [FreeTextEntry9] : Does not feel PT improves shoulder pain. Still bilateral knee and shoulder pain L side more uncomfortable [de-identified] : Evaluated by endo for adrenal adenoma

## 2025-05-15 NOTE — REVIEW OF SYSTEMS
[Negative] : Respiratory [Arthralgias] : arthralgias [As Noted in HPI] : as noted in HPI [FreeTextEntry2] : Still anxious about potential diagnosis [FreeTextEntry9] : Does not feel PT improves shoulder pain. Still bilateral knee and shoulder pain L side more uncomfortable [de-identified] : Evaluated by endo for adrenal adenoma

## 2025-05-15 NOTE — ADDENDUM
[FreeTextEntry1] : 5/14/25 pt requesting MR neck Order entered XR 10/24 neck shows spondylosis moderate to severe no evidence of inflammatory changes

## 2025-06-26 NOTE — REVIEW OF SYSTEMS
[Arthralgias] : arthralgias [As Noted in HPI] : as noted in HPI [Negative] : Respiratory [FreeTextEntry2] : Still anxious about potential diagnosis [Feeling Poorly] : not feeling poorly [FreeTextEntry9] : Improvement in overall pain and stiffness < Does not feel PT improves shoulder pain. Still bilateral knee and shoulder pain L side more uncomfortable [de-identified] : Denies weakness BUE [de-identified] : Evaluated by endo for adrenal adenoma

## 2025-06-26 NOTE — HISTORY OF PRESENT ILLNESS
[Home] : at home, [unfilled] , at the time of the visit. [Other Location: e.g. Home (Enter Location, City,State)___] : at [unfilled] [Telehealth (audio & video)] : This visit was provided via telehealth using real-time 2-way audio visual technology. [Verbal consent obtained from patient] : the patient, [unfilled] [FreeTextEntry1] : 6/24/25  -Patient doing well today reports significant improvement in overall pain and function -Went to a DO in [place] recommended by PT. Recommended additional blood testing and now taking [id number] (amino acids) on for 3 weeks, pregnenolone (natural steroid enhancer, DHEA, and Quercitin (natural antioxidant). 9 days on total regimen - Last day on prednisone was 6/12/25 did not note any change in pain or function w/ discontinuing. Took ibuprofen 6/14/25-6/23/25 did note improvement in pain. Today is first day off ibuporfen completely -Extensive review of imaging L shoulder, L knee, cervical spine (see data reviewed)   ---------------- 5/13/25  -Takes until the afternoon and pain subsides, stiffness not bad. Weather dependent -Started PT for bilateral shoulders 10 visits so far but doesn't feel that that's making a difference. -Did not try meloxicam -Since last visit patient tapered prednisone to 3mg which was not effective even at higher doses for pain and discontinued HCQ reported diarrhea though was well tolerated for 3 weeks prior. -Last colonoscopy 2-3 years ago. 8 years ago C.diff following clindamycin. Gastroenterology Dr. Tino Hurtado -Started on physical therapy shoulders  -No available effusions for aspiration and fluid analysis for inflammatory vs non-inflammatory findings -Adrenal adenoma 12mm on endo evaluation compared to 4mm on radiology evaluation plan for monitoring and updated imaging 6 months and eval adrenal hormones now and then after prednisone  US L knee 4/25/25 no effusion | US b/l shoulders 04/25/25 no effusion US 11/24 bilateral shoulders moderate b/l GH OA w/ R> L effusions and partially decompression into proximal long head biceps tendon sheaths, advanced AC OA, b/l rotator cuff tendinosis XR bilateral knees 11/24: b/l patellar spurring vs enthesopathy 04/25 labs: CRP 12, CBC mild L shift, VECTRA 57 elevated VEGF 890 neg/nl CMP, sUA, CMP, lyme, ESR 25 Recently neg 14.3.3, RF, CCP, mary MT spotted fever ------------- 4/15/25 - transfer of care -Patient feels frustrated 6 months w/ PMR diagnosis and does not feel she has improved in terms of pain and function with steroid. 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 Has not felt significant difference in pain or function with higher doses of prednisone or with taper.  - Felt more relief when using ibuprofen/tylenol. Known diffuse OA -Morning difficulty getting out of bed due to neck, knees, shoulder and hip pain 5-6/10, AMS can be up to 4 hrs . Currently in PT for b/l knees stretching doing exercise would like PT script for b/l shoulders. -Started HCQ a few weeks ago 300mg daily. Aware that this medication will take several months to take effect -Ophthalmologist macular issues which may be exacerbated by prednisone following w/ Dr. Stovall at [facility]. 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 Patient had blood work done at that visit showing IgM kappa band, she saw hematology who did not feel any underlying hematological issue was present. She was noted to have elevated CRP as well.  CT of the chest/abdomen/pelvis was done by his PCP with no suspicious findings.  Patient reports stiffness in neck going down to both shoulder ( R>L)  She reports she also have pain in both quads and hamstrings and b/l knee  Also left side hip  Getting up from seated position hurts, bending down causes pain in her knees.  Chronic back in her low back (L5 disc herniation)  Morning stiffness until she takes NSAIDs and tylenol.  Reports low grade fever with night sweat - twice during this time.  Occasional shortness of breath  Chronic diarrhea since C diff 2017   Patient denies joint swelling, joint erythema/warmth, morning stiffness, fatigue, chills, weight loss, chest pain, abdominal pain, , constipation, blood in stool, dysuria, hematuria, rash, Raynaud's, severe dry eyes, severe dry mouth, , eye pain/redness, vision changes, jaw clauddication, dysphagia, numbness/tingling Hx of DVT/PEs.   Since initial visit on 10/29/24, given elevated ESR/CRP and symptoms possibly due to PMR, we did trial of low dose prednisone.  She did not have improvement with prednisone 15mg. Patient did have improvement in symptoms on prednisone 20mg. Prednisone 20mg from 11/5/24 to 11/19/24 and then taper to prednisone 15mg.She then did 15mg prednisone for 2 weeks, then 12.5mg for 2 weeks, then 10mg for 2 weeks, then 7.5mg for 2 weeks,  However, her symptoms came back when she went down to 7.5mg so after around 4 days of this she went back to 10mg (as of 1/9/25 she is back on 10mg daily)  On the 10mg prednisone, she has some stiffness in shoulders in the AM till around 12/1pm.  Last visit, plan was to taper below 10mg however started having stiffness in her shoulders/hips when down to 8 mg daily. Therefore, in 2/2025, the prednisone was once again increased back to 10mg and advised  taper by 1 mg every 2 weeks . She is now on 6mg prednisone...continues to have morning stiffness, improves by afternoon  We discussed leflunomide versus methotrexate for treatment of PMR. Discussed side effects and benefits of steroid sparing DMARD therapy.   She also in a support group for PMR She is also following with primary for DEXA monitoring  Today patient reports she does not want to start MTX or Lef. She is interested in starting HCQ.     Labs  1/2025 ESR 33, CRP 6  , normal CMP, CBC   11.26/24  ESR 26 CRP13  10/29/24  ESR 44 CRP 37  Neg CCP, 14.33 eta protein      9/13/2024  Negative COMPA, RF ESR 25 normal for age  CRP 45   1/26/23  Negative COMPA, SSA, SSB, RF     Imaging:  10/30/2024   Xrays   Cervical spine  Straightening of the cervical lordosis may represent muscle spasm. Moderate to severe multilevel spondylosis of the cervical spine   Shoulder  Impression: Mild osteoarthritis of the bilateral shoulders.   Knee:  Impression: Bilateral osteopenia. Bilateral patellar spurring.    ULTRASOUND 11/2024  Shoulder:     IMPRESSION: 1. No active synovitis or tenosynovitis of bilateral shoulders. 2. At least moderate bilateral glenohumeral osteoarthritis with right larger than left effusions partially decompressing into the proximal long head biceps tendon sheaths. 3. Advanced bilateral acromioclavicular arthrosis. 4. Right worse than left rotator cuff and proximal long head biceps tendinosis without significant tear.     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

## 2025-06-26 NOTE — PHYSICAL EXAM
[General Appearance - Alert] : alert [General Appearance - In No Acute Distress] : in no acute distress [General Appearance - Well Nourished] : well nourished [General Appearance - Well Developed] : well developed [Sclera] : the sclera and conjunctiva were normal [Extraocular Movements] : extraocular movements were intact [Outer Ear] : the ears and nose were normal in appearance [Neck Appearance] : the appearance of the neck was normal [Respiration, Rhythm And Depth] : normal respiratory rhythm and effort [Exaggerated Use Of Accessory Muscles For Inspiration] : no accessory muscle use [Auscultation Breath Sounds / Voice Sounds] : lungs were clear to auscultation bilaterally [Heart Rate And Rhythm] : heart rate was normal and rhythm regular [Heart Sounds] : normal S1 and S2 [Heart Sounds Gallop] : no gallops [Murmurs] : no murmurs [Heart Sounds Pericardial Friction Rub] : no pericardial rub [Cervical Lymph Nodes Enlarged Posterior Bilaterally] : posterior cervical [Cervical Lymph Nodes Enlarged Anterior Bilaterally] : anterior cervical [Supraclavicular Lymph Nodes Enlarged Bilaterally] : supraclavicular [No CVA Tenderness] : no ~M costovertebral angle tenderness [No Spinal Tenderness] : no spinal tenderness [] : no rash [No Focal Deficits] : no focal deficits [Oriented To Time, Place, And Person] : oriented to person, place, and time [Impaired Insight] : insight and judgment were intact [Mood] : the mood was normal [FreeTextEntry1] : Not tearful during exam today but still clearly anxious

## 2025-06-26 NOTE — ASSESSMENT
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 Slowly tapering off prednisone currently 5 mg would step down to 4 mg tomorrow. Has not felt improvement in terms of pain and function even on higher dose steroids (max 20mg SE palpitations). Noted more relieve with using ibuprofen/tylenol. Known OA diffusely (neck, bilateral shoulders, bilateral knees, L hip and R THR). Otherwise neg ROS. 01/25 CRP 6, WBC 12.99, ESR 33 neg/nl CMP No change in overall exam rROM L knee approx 10-15 degrees and POM L hip flexor and knee US L knee 4/25/25 no effusion | US b/l shoulders 04/25/25 no effusion to aspirate US 11/24 bilateral shoulders moderate b/l GH OA w/ R> L effusions and partially decompression into proximal long head biceps tendon sheaths, advanced AC OA, b/l rotator cuff tendinosis XR bilateral knees 11/24: b/l patellar spurring vs enthesopathy 04/25 labs: CRP 12, CBC mild L shift, VECTRA 57 elevated VEGF 890 neg/nl CMP, sUA, CMP, lyme, ESR 25 Recently neg 14.3.3, RF, CCP, mary MT spotted fever No change in sx on prednisone 3mg, or with PT At this time, pt does not have convincing sxs suggestive of persistent aiCTD however elevated VECTRA, CRP suggestive of some systemic inflammation. Effusion resolved so unable to aspirate for fluid analysis. Also maintain suspicious for potential resolving reactive arthritis. Discussed with patient at length without clear evidence supporting aiCTD diagnosis HCQ is most appropriate and safest medication in terms of SE profile to continue. Pt will consider retrial at lower dose, subtherapeutic 200mg daily to assess tolerability ----------------------------- Pt will continue prednisone taper - completed no difference in symptoms Additionally imaging recommended MR L shoulder and L knee. 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 Pain very responsive to ibuprofen w/ improved function. Now off ibuprofen starting today, pt to monitor response while continuing holistic regimen ([id number] (amino acids) on for 3 weeks, pregnenolone (natural steroid enhancer, DHEA, and Quercitin (natural antioxidant)) Discussed steroid injection/colchicine as options for treatment/prevention if pain and decreased rROM recurs to prevent further destruction of joints. Pt verbalized understanding and will contact provider to discuss further NOTE: -Pt attributing episode of worsening diarrhea and hair shedding to HCQ so discontinued  #Adrenal adenoma No symptoms suggestive of adrenal dysfunction. Adrenal adenoma 12mm on endo evaluation compared to 4mm on radiology evaluation plan for monitoring and updated imaging 6 months and eval adrenal hormones now and then after prednisone  PLAN 6/24/25 -Continue f/u DO holistic provider  -Reviewed all imaging  -Cleared to resume PT script b/l shoulders  -Continue off prednisone  -Can continue to use ibuprofen PRN  -Repeat labs before next visit  -Continue f/u endocrinology regarding adrenal adenoma  -F/u 3 month RPA or sooner if worsening symptoms  Patient was seen and evaluated by Jennifer Germain DNP (training in rheumatology) and with EUGENIA Celestetos available for consult regarding patient plan of care.

## 2025-06-26 NOTE — HISTORY OF PRESENT ILLNESS
[Home] : at home, [unfilled] , at the time of the visit. [Other Location: e.g. Home (Enter Location, City,State)___] : at [unfilled] [Telehealth (audio & video)] : This visit was provided via telehealth using real-time 2-way audio visual technology. [Verbal consent obtained from patient] : the patient, [unfilled] [FreeTextEntry1] : 6/24/25  -Patient doing well today reports significant improvement in overall pain and function -Went to a DO in [place] recommended by PT. Recommended additional blood testing and now taking WCL518 (amino acids) on for 3 weeks, pregnenolone (natural steroid enhancer, DHEA, and Quercitin (natural antioxidant). 9 days on total regimen - Last day on prednisone was 6/12/25 did not note any change in pain or function w/ discontinuing. Took ibuprofen 6/14/25-6/23/25 did note improvement in pain. Today is first day off ibuporfen completely -Extensive review of imaging L shoulder, L knee, cervical spine (see data reviewed)   ---------------- 5/13/25  -Takes until the afternoon and pain subsides, stiffness not bad. Weather dependent -Started PT for bilateral shoulders 10 visits so far but doesn't feel that that's making a difference. -Did not try meloxicam -Since last visit patient tapered prednisone to 3mg which was not effective even at higher doses for pain and discontinued HCQ reported diarrhea though was well tolerated for 3 weeks prior. -Last colonoscopy 2-3 years ago. 8 years ago C.diff following clindamycin. Gastroenterology Dr. Tino Hurtado -Started on physical therapy shoulders  -No available effusions for aspiration and fluid analysis for inflammatory vs non-inflammatory findings -Adrenal adenoma 12mm on endo evaluation compared to 4mm on radiology evaluation plan for monitoring and updated imaging 6 months and eval adrenal hormones now and then after prednisone  US L knee 4/25/25 no effusion | US b/l shoulders 04/25/25 no effusion US 11/24 bilateral shoulders moderate b/l GH OA w/ R> L effusions and partially decompression into proximal long head biceps tendon sheaths, advanced AC OA, b/l rotator cuff tendinosis XR bilateral knees 11/24: b/l patellar spurring vs enthesopathy 04/25 labs: CRP 12, CBC mild L shift, VECTRA 57 elevated VEGF 890 neg/nl CMP, sUA, CMP, lyme, ESR 25 Recently neg 14.3.3, RF, CCP, mary MT spotted fever ------------- 4/15/25 - transfer of care -Patient feels frustrated 6 months w/ PMR diagnosis and does not feel she has improved in terms of pain and function with steroid. 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 Has not felt significant difference in pain or function with higher doses of prednisone or with taper.  - Felt more relief when using ibuprofen/tylenol. Known diffuse OA -Morning difficulty getting out of bed due to neck, knees, shoulder and hip pain 5-6/10, AMS can be up to 4 hrs . Currently in PT for b/l knees stretching doing exercise would like PT script for b/l shoulders. -Started HCQ a few weeks ago 300mg daily. Aware that this medication will take several months to take effect -Ophthalmologist macular issues which may be exacerbated by prednisone following w/ Dr. Stovall at [facility]. 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 Patient had blood work done at that visit showing IgM kappa band, she saw hematology who did not feel any underlying hematological issue was present. She was noted to have elevated CRP as well.  CT of the chest/abdomen/pelvis was done by his PCP with no suspicious findings.  Patient reports stiffness in neck going down to both shoulder ( R>L)  She reports she also have pain in both quads and hamstrings and b/l knee  Also left side hip  Getting up from seated position hurts, bending down causes pain in her knees.  Chronic back in her low back (L5 disc herniation)  Morning stiffness until she takes NSAIDs and tylenol.  Reports low grade fever with night sweat - twice during this time.  Occasional shortness of breath  Chronic diarrhea since C diff 2017   Patient denies joint swelling, joint erythema/warmth, morning stiffness, fatigue, chills, weight loss, chest pain, abdominal pain, , constipation, blood in stool, dysuria, hematuria, rash, Raynaud's, severe dry eyes, severe dry mouth, , eye pain/redness, vision changes, jaw clauddication, dysphagia, numbness/tingling Hx of DVT/PEs.   Since initial visit on 10/29/24, given elevated ESR/CRP and symptoms possibly due to PMR, we did trial of low dose prednisone.  She did not have improvement with prednisone 15mg. Patient did have improvement in symptoms on prednisone 20mg. Prednisone 20mg from 11/5/24 to 11/19/24 and then taper to prednisone 15mg.She then did 15mg prednisone for 2 weeks, then 12.5mg for 2 weeks, then 10mg for 2 weeks, then 7.5mg for 2 weeks,  However, her symptoms came back when she went down to 7.5mg so after around 4 days of this she went back to 10mg (as of 1/9/25 she is back on 10mg daily)  On the 10mg prednisone, she has some stiffness in shoulders in the AM till around 12/1pm.  Last visit, plan was to taper below 10mg however started having stiffness in her shoulders/hips when down to 8 mg daily. Therefore, in 2/2025, the prednisone was once again increased back to 10mg and advised  taper by 1 mg every 2 weeks . She is now on 6mg prednisone...continues to have morning stiffness, improves by afternoon  We discussed leflunomide versus methotrexate for treatment of PMR. Discussed side effects and benefits of steroid sparing DMARD therapy.   She also in a support group for PMR She is also following with primary for DEXA monitoring  Today patient reports she does not want to start MTX or Lef. She is interested in starting HCQ.     Labs  1/2025 ESR 33, CRP 6  , normal CMP, CBC   11.26/24  ESR 26 CRP13  10/29/24  ESR 44 CRP 37  Neg CCP, 14.33 eta protein      9/13/2024  Negative COMPA, RF ESR 25 normal for age  CRP 45   1/26/23  Negative COMPA, SSA, SSB, RF     Imaging:  10/30/2024   Xrays   Cervical spine  Straightening of the cervical lordosis may represent muscle spasm. Moderate to severe multilevel spondylosis of the cervical spine   Shoulder  Impression: Mild osteoarthritis of the bilateral shoulders.   Knee:  Impression: Bilateral osteopenia. Bilateral patellar spurring.    ULTRASOUND 11/2024  Shoulder:     IMPRESSION: 1. No active synovitis or tenosynovitis of bilateral shoulders. 2. At least moderate bilateral glenohumeral osteoarthritis with right larger than left effusions partially decompressing into the proximal long head biceps tendon sheaths. 3. Advanced bilateral acromioclavicular arthrosis. 4. Right worse than left rotator cuff and proximal long head biceps tendinosis without significant tear.     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

## 2025-06-26 NOTE — ASSESSMENT
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 Slowly tapering off prednisone currently 5 mg would step down to 4 mg tomorrow. Has not felt improvement in terms of pain and function even on higher dose steroids (max 20mg SE palpitations). Noted more relieve with using ibuprofen/tylenol. Known OA diffusely (neck, bilateral shoulders, bilateral knees, L hip and R THR). Otherwise neg ROS. 01/25 CRP 6, WBC 12.99, ESR 33 neg/nl CMP No change in overall exam rROM L knee approx 10-15 degrees and POM L hip flexor and knee US L knee 4/25/25 no effusion | US b/l shoulders 04/25/25 no effusion to aspirate US 11/24 bilateral shoulders moderate b/l GH OA w/ R> L effusions and partially decompression into proximal long head biceps tendon sheaths, advanced AC OA, b/l rotator cuff tendinosis XR bilateral knees 11/24: b/l patellar spurring vs enthesopathy 04/25 labs: CRP 12, CBC mild L shift, VECTRA 57 elevated VEGF 890 neg/nl CMP, sUA, CMP, lyme, ESR 25 Recently neg 14.3.3, RF, CCP, mary MT spotted fever No change in sx on prednisone 3mg, or with PT At this time, pt does not have convincing sxs suggestive of persistent aiCTD however elevated VECTRA, CRP suggestive of some systemic inflammation. Effusion resolved so unable to aspirate for fluid analysis. Also maintain suspicious for potential resolving reactive arthritis. Discussed with patient at length without clear evidence supporting aiCTD diagnosis HCQ is most appropriate and safest medication in terms of SE profile to continue. Pt will consider retrial at lower dose, subtherapeutic 200mg daily to assess tolerability ----------------------------- Pt will continue prednisone taper - completed no difference in symptoms Additionally imaging recommended MR L shoulder and L knee. 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 Pain very responsive to ibuprofen w/ improved function. Now off ibuprofen starting today, pt to monitor response while continuing holistic regimen (MOT959 (amino acids) on for 3 weeks, pregnenolone (natural steroid enhancer, DHEA, and Quercitin (natural antioxidant)) Discussed steroid injection/colchicine as options for treatment/prevention if pain and decreased rROM recurs to prevent further destruction of joints. Pt verbalized understanding and will contact provider to discuss further NOTE: -Pt attributing episode of worsening diarrhea and hair shedding to HCQ so discontinued  #Adrenal adenoma No symptoms suggestive of adrenal dysfunction. Adrenal adenoma 12mm on endo evaluation compared to 4mm on radiology evaluation plan for monitoring and updated imaging 6 months and eval adrenal hormones now and then after prednisone  PLAN 6/24/25 -Continue f/u DO holistic provider  -Reviewed all imaging  -Cleared to resume PT script b/l shoulders  -Continue off prednisone  -Can continue to use ibuprofen PRN  -Repeat labs before next visit  -Continue f/u endocrinology regarding adrenal adenoma  -F/u 3 month RPA or sooner if worsening symptoms  Patient was seen and evaluated by Jennifer Germain DNP (training in rheumatology) and with EUGENIA Celestetos available for consult regarding patient plan of care.

## 2025-06-26 NOTE — REVIEW OF SYSTEMS
[Arthralgias] : arthralgias [As Noted in HPI] : as noted in HPI [Negative] : Respiratory [FreeTextEntry2] : Still anxious about potential diagnosis [Feeling Poorly] : not feeling poorly [FreeTextEntry9] : Improvement in overall pain and stiffness < Does not feel PT improves shoulder pain. Still bilateral knee and shoulder pain L side more uncomfortable [de-identified] : Denies weakness BUE [de-identified] : Evaluated by endo for adrenal adenoma

## 2025-06-26 NOTE — DATA REVIEWED
[FreeTextEntry1] : L shoulder MR 05/25: discussed w/ Dr. Eddy radiology in agreement not effusion on most recent US, finding on MR may be thickened synovium. Recommendation for lavage if interested in getting sample to evaluate for crystals. Otherwise severe GH arthrosis, partial tearing RTC, calcific tendinosis suggestive of crystalline disease hydroxyapatite athropathy L knee MR 05/25: No osseous erosions or marrow signal abnormality. No advanced cartilage loss. Intrasubstance degeneration of the posterior horn of the lateral meniscus. Small knee effusion. Prepatellar edema. CS MRI: Extensive multilevel disc disease, facet arthropathy, osteophytes, w/ neural foraminal narrowing at several levels bilaterally

## 2025-07-14 NOTE — PHYSICAL EXAM
[No Acute Distress] : no acute distress [Well-Appearing] : well-appearing [No Respiratory Distress] : no respiratory distress  [No Accessory Muscle Use] : no accessory muscle use [Clear to Auscultation] : lungs were clear to auscultation bilaterally [Normal Rate] : normal rate  [Regular Rhythm] : with a regular rhythm [Soft] : abdomen soft [Non Tender] : non-tender [Non-distended] : non-distended [No Masses] : no abdominal mass palpated [No HSM] : no HSM [No Focal Deficits] : no focal deficits [Normal Affect] : the affect was normal

## 2025-07-14 NOTE — PHYSICAL EXAM
[de-identified] : General: Awake, alert, no acute distress, Patient was cooperative and appropriate during the examination.  Walks without an antalgic gait.   Left shoulder Exam: Physical exam of the shoulder demonstrates normal skin without signs of skin changes or abnormalities. No erythema, warmth, or joint effusion appreciated.  Sensation intact light touch C5-T1 Palpable radial pulse Radial/ulnar/median/axillary/musculocutaneous/AIN/PIN nerves grossly intact  Range of motion: Forward Flexion: 175 Abduction: 140 External Rotation: 45 Internal Rotation: L3  Palpation: Moderately tender to palpation over the glenohumeral joint Moderately tender palpation over the rotator cuff insertion on the greater tuberosity Not tender to palpation over the AC joint Mildly tender to palpation of the biceps tendon/bicipital groove  Strength testing: Supraspinatus: 5/5 Infraspinatus: 5/5 Subscapularis: 5/5  Special test: Empty can test positive Ayon impingement test positive Speeds test negative Kenton's test negative Lift-off test negative Bell-press test negative Cross-arm adduction test negative   [de-identified] : MRI of the left shoulder from a [facility] on 5/23/2025 was reviewed the patient today in the office: Severe glenohumeral arthrosis. Moderate to large humeral joint effusion with synovitis. -Severe acromioclavicular joint arthrosis with prominent subarticular edema, predominantly involving the clavicular aspect. This is suggestive of stress reaction. -Focal moderate partial-thickness articular surface tear involving the posterior fibers of the supraspinatus tendon occurring just proximal to the insertion. Moderate subscapularis tendinosis with intrasubstance tearing at the mid insertion. -Mild tendinosis of the long head biceps tendon. Circumferential fluid in the extra articular biceps tendon sheath may be related to tenosynovitis or decompression of joint fluid. -Punctate focus of calcific tendinosis within the posterior insertional fibers of the infraspinatus tendon.

## 2025-07-14 NOTE — REASON FOR VISIT
[Initial Visit] : an initial visit for [Shoulder Pain] : shoulder pain [FreeTextEntry2] : Left shoulder pain.

## 2025-07-14 NOTE — DISCUSSION/SUMMARY
[de-identified] : Assessment: 76-year-old female with left shoulder pain secondary to glenohumeral arthritis, rotator cuff partial tearing, biceps tenosynovitis, AC joint arthropathy  Plan: I had a long discussion with the patient today regarding the nature of their diagnosis and treatment plan. We discussed the risks and benefits of no treatment as well as nonoperative and operative treatments.  I reviewed the patient's MRI today with her in the office which demonstrates severe glenohumeral arthrosis, partial tearing of the rotator cuff in the absence of any full-thickness tearing, AC joint arthropathy with a stress reaction of the distal clavicle and biceps tendinopathy.  On examination today most the patient's pain is over the anterior shoulder.  She has no pain over the AC joint today.  At this time I recommend conservative treatment of the patient's condition with modalities including rest, ice, heat, anti-inflammatory medications, activity modifications, and home stretching and strengthening exercises. I discussed with the patient the risks and benefits associated with NSAID use. GI precautions were discussed.  A left shoulder ultrasound-guided glenohumeral joint cortisone injection was administered today.  The patient tolerated this well.  She will follow-up in 8 weeks repeat clinical assessment.  If symptoms persist she may be interested in viscosupplementation.  The patient verbalizes their understanding and agrees with the plan.  All questions were answered to their satisfaction.   I, Dr. Abel, personally performed the evaluation and management (E/M) services for this new patient.  That E/M includes conducting the clinically appropriate initial history &/or exam, assessing all conditions, and establishing the plan of care.  Today, my MONIQUE, was here to observe my evaluation and management service for this patient & follow plan of care established by me going forward.

## 2025-07-14 NOTE — PROCEDURE
[de-identified] : I injected the patient's left shoulder glenohumeral joint today with cortisone under ultrasound guidance.  I discussed at length with the patient the planned steroid and lidocaine injection. The risks, benefits, convalescence and alternatives were reviewed. The possible side effects discussed included but were not limited to: pain, swelling, heat, bleeding, and redness. Symptoms are generally mild but if they are extensive then contact the office. Giving pain relievers by mouth such as NSAIDs or Tylenol can generally treat the reactions to steroid and lidocaine. Rare cases of infection have been noted. Rash, hives and itching may occur post injection. If you have muscle pain or cramps, flushing and or swelling of the face, rapid heart beat, nausea, dizziness, fever, chills, headache, difficulty breathing, swelling in the arms or legs, or have a prickly feeling of your skin, contact a health care provider immediately. Following this discussion, the shoulder was prepped with Chlorhexidine and Alcohol and under sterile conditions the 80 mg Depo-Medrol and 4 cc Lidocaine injection was performed with a 22 gauge spinal needle through a posterolateral injection site. The needle was introduced into the glenohumeral joint space and the medication was injected under ultrasound guidance. Upon withdrawal of the needle the site was cleaned with alcohol and a band aid was applied. The patient tolerated the injection well and there were no adverse effects. Post injection instructions included no strenuous activity for 24 hours, cryotherapy and if there are any adverse effects to contact the office.

## 2025-07-14 NOTE — HISTORY OF PRESENT ILLNESS
[de-identified] : 7/14/2025: Melina is a pleasant 76-year-old right-hand-dominant female who presents the office today for evaluation of left shoulder pain.  Her pain began about a year ago.  She multiple aches and pains around that time.  She was told that she may have polymyalgia rheumatica and was placed on prednisone for about 8 months.  However, her symptoms were not much improved.  Ultimately it is felt that she does not have PMR.  Regarding the shoulder, she has undergone extensive conservative care in the form of anti-inflammatory medications and physical therapy which have not significantly helped her.  She is also tried acupuncture and some other herbal medications which have not helped her.  She is here today for specialist opinion.  She did have an MRI of her shoulder.  She denies any fevers, chills, sweats, or pain elsewhere.

## 2025-07-17 NOTE — HISTORY OF PRESENT ILLNESS
[FreeTextEntry8] : The patient presents secondary to continued rheumatological issues with patient following with rheumatology with no specific diagnosis.  Initially thought patient had PMR therefore treated with prednisone with minimal improvement therefore diagnosis of PMR in question. Prednisone has been weaned. Meloxicam discontinued.  Patient saw a different "natural" doctor who has her on supplements but also ibuprofen 2 tablets daily started about a month ago.  1 week ago she noted dark but not black stools.  She told the other physician who told her to stop the ibuprofen and do Hemoccults which she was supposed to be sent but never received.  No epigastric abdominal pain, nausea or change in appetite.  She states her stools have improved.

## 2025-07-17 NOTE — ASSESSMENT
[FreeTextEntry1] : Patient complaining of dark but not black stools noted about a week ago having taken ibuprofen daily for 2 to 3 weeks.  Now ibuprofen discontinued. She states stools have improved.  Question if this is melena. Hemoccults given Patient states she had blood work this morning and will check results.